# Patient Record
Sex: FEMALE | Race: WHITE
[De-identification: names, ages, dates, MRNs, and addresses within clinical notes are randomized per-mention and may not be internally consistent; named-entity substitution may affect disease eponyms.]

---

## 2019-01-28 ENCOUNTER — HOSPITAL ENCOUNTER (OUTPATIENT)
Dept: HOSPITAL 75 - RAD | Age: 39
End: 2019-01-28
Attending: NURSE PRACTITIONER
Payer: COMMERCIAL

## 2019-01-28 DIAGNOSIS — Z12.31: Primary | ICD-10-CM

## 2019-01-28 PROCEDURE — 77067 SCR MAMMO BI INCL CAD: CPT

## 2019-01-29 NOTE — DIAGNOSTIC IMAGING REPORT
EXAMINATION: Digital Mammogram bilateral screening with 3D

tomosynthesis and computer-aided detection (CAD) system.



INDICATION: Screening.



COMPARISON: There are no prior studies available for comparison.



At this time, there are no current complaints.



FINDINGS: The fibroglandular tissue in both breasts is

heterogeneously dense. This does limit the sensitivity of this

exam. There are a few benign-appearing calcifications in the

retroareolar region of the left breast. There is no primary or

secondary sign of malignancy noted.



IMPRESSION:

1. There is no evidence of malignancy.

2. The patient should have her annual bilateral screening

mammogram on schedule in January of 2019.



ACR BI-RADS Category 1: Negative.

Result letter will be mailed to the patient.

Note:  At least 10% of breast cancer is not imaged by

mammography.



Dictated by: 



  Dictated on workstation # [id number]

## 2020-08-15 ENCOUNTER — HOSPITAL ENCOUNTER (EMERGENCY)
Dept: HOSPITAL 75 - ER | Age: 40
Discharge: HOME | End: 2020-08-15
Payer: COMMERCIAL

## 2020-08-15 VITALS — DIASTOLIC BLOOD PRESSURE: 78 MMHG | SYSTOLIC BLOOD PRESSURE: 103 MMHG

## 2020-08-15 VITALS — HEIGHT: 65.91 IN | BODY MASS INDEX: 30.62 KG/M2 | WEIGHT: 188.27 LBS

## 2020-08-15 DIAGNOSIS — N20.1: Primary | ICD-10-CM

## 2020-08-15 LAB
ALBUMIN SERPL-MCNC: 4.5 GM/DL (ref 3.2–4.5)
ALP SERPL-CCNC: 75 U/L (ref 40–136)
ALT SERPL-CCNC: 15 U/L (ref 0–55)
APTT PPP: YELLOW S
BACTERIA #/AREA URNS HPF: (no result) /HPF
BASOPHILS # BLD AUTO: 0 10^3/UL (ref 0–0.1)
BASOPHILS NFR BLD AUTO: 1 % (ref 0–10)
BILIRUB SERPL-MCNC: 0.8 MG/DL (ref 0.1–1)
BILIRUB UR QL STRIP: NEGATIVE
BUN/CREAT SERPL: 17
CALCIUM SERPL-MCNC: 9.3 MG/DL (ref 8.5–10.1)
CHLORIDE SERPL-SCNC: 108 MMOL/L (ref 98–107)
CO2 SERPL-SCNC: 21 MMOL/L (ref 21–32)
CREAT SERPL-MCNC: 0.88 MG/DL (ref 0.6–1.3)
EOSINOPHIL # BLD AUTO: 0.1 10^3/UL (ref 0–0.3)
EOSINOPHIL NFR BLD AUTO: 1 % (ref 0–10)
ERYTHROCYTE [DISTWIDTH] IN BLOOD BY AUTOMATED COUNT: 12.2 % (ref 10–14.5)
FIBRINOGEN PPP-MCNC: CLEAR MG/DL
GFR SERPLBLD BASED ON 1.73 SQ M-ARVRAT: > 60 ML/MIN
GLUCOSE SERPL-MCNC: 99 MG/DL (ref 70–105)
GLUCOSE UR STRIP-MCNC: NEGATIVE MG/DL
HCT VFR BLD CALC: 42 % (ref 35–52)
HGB BLD-MCNC: 14 G/DL (ref 11.5–16)
KETONES UR QL STRIP: (no result)
LEUKOCYTE ESTERASE UR QL STRIP: NEGATIVE
LYMPHOCYTES # BLD AUTO: 1.8 X 10^3 (ref 1–4)
LYMPHOCYTES NFR BLD AUTO: 36 % (ref 12–44)
MANUAL DIFFERENTIAL PERFORMED BLD QL: NO
MCH RBC QN AUTO: 30 PG (ref 25–34)
MCHC RBC AUTO-ENTMCNC: 34 G/DL (ref 32–36)
MCV RBC AUTO: 90 FL (ref 80–99)
MONOCYTES # BLD AUTO: 0.3 X 10^3 (ref 0–1)
MONOCYTES NFR BLD AUTO: 7 % (ref 0–12)
NEUTROPHILS # BLD AUTO: 2.8 X 10^3 (ref 1.8–7.8)
NEUTROPHILS NFR BLD AUTO: 55 % (ref 42–75)
NITRITE UR QL STRIP: NEGATIVE
PH UR STRIP: 6 [PH] (ref 5–9)
PLATELET # BLD: 199 10^3/UL (ref 130–400)
PMV BLD AUTO: 9.8 FL (ref 7.4–10.4)
POTASSIUM SERPL-SCNC: 3.7 MMOL/L (ref 3.6–5)
PROT SERPL-MCNC: 7.7 GM/DL (ref 6.4–8.2)
PROT UR QL STRIP: (no result)
SODIUM SERPL-SCNC: 141 MMOL/L (ref 135–145)
SP GR UR STRIP: >=1.03 (ref 1.02–1.02)
SQUAMOUS #/AREA URNS HPF: (no result) /HPF
WBC # BLD AUTO: 5 10^3/UL (ref 4.3–11)
WBC #/AREA URNS HPF: (no result) /HPF

## 2020-08-15 PROCEDURE — 80053 COMPREHEN METABOLIC PANEL: CPT

## 2020-08-15 PROCEDURE — 84703 CHORIONIC GONADOTROPIN ASSAY: CPT

## 2020-08-15 PROCEDURE — 36415 COLL VENOUS BLD VENIPUNCTURE: CPT

## 2020-08-15 PROCEDURE — 81000 URINALYSIS NONAUTO W/SCOPE: CPT

## 2020-08-15 PROCEDURE — 85025 COMPLETE CBC W/AUTO DIFF WBC: CPT

## 2020-08-15 PROCEDURE — 74176 CT ABD & PELVIS W/O CONTRAST: CPT

## 2020-08-15 PROCEDURE — 86141 C-REACTIVE PROTEIN HS: CPT

## 2020-08-15 NOTE — DIAGNOSTIC IMAGING REPORT
PROCEDURE: CT urinary tract, rule out kidney stone.



TECHNIQUE: Multiple contiguous axial images were obtained through

the abdomen and pelvis without the use of intravenous contrast.

Auto Exposure Controls were utilized during the CT exam to meet

ALARA standards for radiation dose reduction. 



INDICATION:  Left flank pain.



COMPARISON: There are no prior studies available for comparison.



FINDINGS: The images through the low pelvis do show that there is

a 3.5 mm calculus within the bladder just to the left of midline.

The left ureter and left renal pelvis are dilated and I suspect

that this calculus was producing partial obstruction of the left

collecting system before it passed into the bladder. There is

also a 2 mm nonobstructive calculus within the left kidney. There

is no sign of nephrolithiasis or urolithiasis on the right.



There is no acute pelvic abnormality noted otherwise. The urinary

bladder itself is grossly unremarkable. The uterus does not

appear to be enlarged. There is no pelvic mass or free fluid

collection noted. The appendix is not well visualized, but there

are no indirect signs of acute appendicitis.



The liver, spleen, pancreas, gallbladder, adrenals, aorta and

inferior vena cava show no sign of an acute abnormality. There is

a small hiatal hernia. The lung bases are clear. The bone windows

show no evidence for a fracture or for a destructive lesion.



IMPRESSION:

1. There is a 3.5 mm calculus within the bladder just to the left

of midline. Most likely, this calculus was recently passed into

the bladder, and I suspect that the calculus was producing

partial obstruction of the left collecting system.

2. There is no acute abnormality of the abdomen or pelvis noted

otherwise.

3. The appendix is not well visualized.



Dictated by: 



  Dictated on workstation # [id number]

## 2020-08-15 NOTE — ED ABDOMINAL PAIN
General


Chief Complaint:  Abdominal/GI Problems


Stated Complaint:  ABD PAIN


Nursing Triage Note:  


AMB TO ED C/O L LOWER QUAD  PAIN ONSET 3 DAYS AGO.   DESCRIBES AS CRAMPING. 3 


DAYS AGO HAD  CONSTIPATION NOW IS INCREASED ABD CRAMPING WITH  DIARRHEA AND 


NAUSEA.


Sepsis Screen:  No Definite Risk


Source of Information:  Patient


Exam Limitations:  No Limitations





History of Present Illness


Date Seen by Provider:  Aug 15, 2020


Time Seen by Provider:  10:02


Initial Comments


Here with report of left lower quadrant abdominal pain that has been gone on for

3 days. It was better and worse. She thought she might be constipated but had a 

little diarrhea. The cramping is much worse today. She has not had anything for 

the pain. She is uncomfortable really in any position. Denies fever or chills 

although is a little bit clammy currently. Denies upper respiratory symptoms. 

Has had tubal ligation as well as 2 previous C-sections. Last menstrual period 

was a week ago when uncomplicated. She is sexually active but it's been several 

weeks. She is monogamous with single partner that is her . Denies blood 

in her urine or stool.


Timing/Duration:  3-4 Days


Severity/Quality:  Moderate, Aching, Cramping


Location:  [facility]


Radiation:  Back


Activities at Onset:  None


Modifying Factors:  Improves With Defecating


Associated Symptoms:  Back Pain; No Chest Pain, No Fever/Chills; 

Nausea/Vomiting; No Shortness of Air, No Swelling/Mass in Abdomen, No Weakness





Allergies and Home Medications


Allergies


Coded Allergies:  


     No Known Drug Allergies (Unverified , 8/15/20)





Patient Home Medication List


Home Medication List Reviewed:  Yes





Review of Systems


Review of Systems


Constitutional:  see HPI; No chills, No fever


EENTM:  No Symptoms Reported


Respiratory:  Denies Cough, Denies Shortness of Air


Cardiovascular:  Denies Chest Pain, Denies Edema


Gastrointestinal:  Abdominal Pain, Diarrhea, Vomiting


Genitourinary:  Denies Burning, Denies Drainage, Denies Frequency, Denies Flank 

Pain, Denies Hematuria


Musculoskeletal:  see HPI; No muscle pain





All Other Systems Reviewed


Negative Unless Noted:  Yes





Past Medical-Social-Family Hx


Past Med/Social Hx:  Reviewed Nursing Past Med/Soc Hx


Patient Social History


Alcohol Use:  Denies Use


Recreational Drug Use:  No


Smoking Status:  Never a Smoker


Recent Foreign Travel:  No


Contact w/Someone Who Travel:  No


Recent Infectious Disease Expo:  No





Past Medical History


Surgeries:  Yes


 Section


Cardiac:  No


Genitourinary:  No


Gastrointestinal:  Yes


Gastroesophageal Reflux


Musculoskeletal:  No


Endocrine:  No


HEENT:  No


Cancer:  No


Psychosocial:  No





Family Medical History


Reviewed Nursing Family Hx





Physical Exam


Vital Signs





Vital Signs - First Documented








 8/15/20





 09:04


 


Temp 36.6


 


Pulse 88


 


Resp 18


 


B/P (MAP) 129/100 (110)


 


Pulse Ox 100


 


O2 Delivery Room Air





Capillary Refill : Less Than 3 Seconds


Height/Weight/BMI


Height: '"


Weight: lbs. oz. kg; 30.00 BMI


Method:


General Appearance:  WD/WN, no apparent distress


HEENT:  PERRL/EOMI, pharynx normal


Neck:  full range of motion, supple


Respiratory:  lungs clear, normal breath sounds


Cardiovascular:  regular rate, rhythm, no murmur


Gastrointestinal:  soft; No guarding, No rebound; tenderness (left lower 

quadrant and suprapubic)


Extremities:  non-tender, normal inspection


Back:  normal inspection, no CVA tenderness, no vertebral tenderness


Neurologic/Psychiatric:  alert, oriented x 3


Skin:  normal color, warm/dry





Progress/Results/Core Measures


Results/Orders


Lab Results





Laboratory Tests








Test


 8/15/20


09:21 8/15/20


11:10 Range/Units


 


 


White Blood Count


 5.0 


 


 4.3-11.0


10^3/uL


 


Red Blood Count


 4.62 


 


 4.35-5.85


10^6/uL


 


Hemoglobin 14.0   11.5-16.0  G/DL


 


Hematocrit 42   35-52  %


 


Mean Corpuscular Volume 90   80-99  FL


 


Mean Corpuscular Hemoglobin 30   25-34  PG


 


Mean Corpuscular Hemoglobin


Concent 34 


 


 32-36  G/DL





 


Red Cell Distribution Width 12.2   10.0-14.5  %


 


Platelet Count


 199 


 


 130-400


10^3/uL


 


Mean Platelet Volume 9.8   7.4-10.4  FL


 


Neutrophils (%) (Auto) 55   42-75  %


 


Lymphocytes (%) (Auto) 36   12-44  %


 


Monocytes (%) (Auto) 7   0-12  %


 


Eosinophils (%) (Auto) 1   0-10  %


 


Basophils (%) (Auto) 1   0-10  %


 


Neutrophils # (Auto) 2.8   1.8-7.8  X 10^3


 


Lymphocytes # (Auto) 1.8   1.0-4.0  X 10^3


 


Monocytes # (Auto) 0.3   0.0-1.0  X 10^3


 


Eosinophils # (Auto)


 0.1 


 


 0.0-0.3


10^3/uL


 


Basophils # (Auto)


 0.0 


 


 0.0-0.1


10^3/uL


 


Sodium Level 141   135-145  MMOL/L


 


Potassium Level 3.7   3.6-5.0  MMOL/L


 


Chloride Level 108 H    MMOL/L


 


Carbon Dioxide Level 21   21-32  MMOL/L


 


Anion Gap 12   5-14  MMOL/L


 


Blood Urea Nitrogen 15   7-18  MG/DL


 


Creatinine


 0.88 


 


 0.60-1.30


MG/DL


 


Estimat Glomerular Filtration


Rate > 60 


 


  





 


BUN/Creatinine Ratio 17    


 


Glucose Level 99     MG/DL


 


Calcium Level 9.3   8.5-10.1  MG/DL


 


Corrected Calcium 8.9   8.5-10.1  MG/DL


 


Total Bilirubin 0.8   0.1-1.0  MG/DL


 


Aspartate Amino Transf


(AST/SGOT) 17 


 


 5-34  U/L





 


Alanine Aminotransferase


(ALT/SGPT) 15 


 


 0-55  U/L





 


Alkaline Phosphatase 75     U/L


 


C-Reactive Protein High


Sensitivity 0.18 


 


 0.00-0.50


MG/DL


 


Total Protein 7.7   6.4-8.2  GM/DL


 


Albumin 4.5   3.2-4.5  GM/DL


 


Serum Pregnancy Test,


Qualitative NEGATIVE 


 


 NEGATIVE  





 


Urine Color  YELLOW   


 


Urine Clarity  CLEAR   


 


Urine pH  6.0  5-9  


 


Urine Specific Gravity  >=1.030  1.016-1.022  


 


Urine Protein  3+ H NEGATIVE  


 


Urine Glucose (UA)  NEGATIVE  NEGATIVE  


 


Urine Ketones  TRACE H NEGATIVE  


 


Urine Nitrite  NEGATIVE  NEGATIVE  


 


Urine Bilirubin  NEGATIVE  NEGATIVE  


 


Urine Urobilinogen  0.2  < = 1.0  MG/DL


 


Urine Leukocyte Esterase  NEGATIVE  NEGATIVE  


 


Urine RBC (Auto)  3+ H NEGATIVE  


 


Urine RBC  10-25 H  /HPF


 


Urine WBC  NONE   /HPF


 


Urine Squamous Epithelial


Cells 


 5-10 


  /HPF





 


Urine Crystals  NONE   /LPF


 


Urine Bacteria  FEW H  /HPF


 


Urine Casts  NONE   /LPF


 


Urine Mucus  SMALL H  /LPF


 


Urine Culture Indicated  NO   








My Orders





Orders - ELIGIO BETHEA MD


Cbc With Automated Diff (8/15/20 10:07)


Comprehensive Metabolic Panel (8/15/20 10:07)


Hs C Reactive Protein (8/15/20 10:07)


Hcg,Qualitative Serum (8/15/20 10:07)


Ua Culture If Indicated (8/15/20 10:07)


Ondansetron Injection (Zofran Injectio (8/15/20 10:15)


Lactated Ringers (Lr 1000 Ml Iv Solution (8/15/20 10:07)


Ed Iv/Invasive Line Start (8/15/20 10:07)


Fentanyl  Injection (Sublimaze Injection (8/15/20 10:07)


Ketorolac Injection (Toradol Injection) (8/15/20 10:07)


Ct Abd/Pelvis Wo(Kidney Stone) (8/15/20 11:58)


Fentanyl  Injection (Sublimaze Injection (8/15/20 12:00)


Phenazopyridine Tablet (Pyridium Tablet) (8/15/20 12:58)


Ceftriaxone  For Iv Use (Rocephin  For I (8/15/20 12:58)





Medications Given in ED





Current Medications








 Medications  Dose


 Ordered  Sig/Stuart


 Route  Start Time


 Stop Time Status Last Admin


Dose Admin


 


 Ondansetron HCl  4 mg  ONCE  ONCE


 IVP  8/15/20 10:15


 8/15/20 10:16 DC 8/15/20 10:16


4 MG








Vital Signs/I&O











 8/15/20





 09:04


 


Temp 36.6


 


Pulse 88


 


Resp 18


 


B/P (MAP) 129/100 (110)


 


Pulse Ox 100


 


O2 Delivery Room Air














Blood Pressure Mean:                    110











Progress


Progress Note :  


Progress Note


Seen and evaluated. IV, labs, UA, LR 1 L bolus, Zofran 4 mg IV, Toradol 30 mg IV

and fentanyl 50 g IV ordered. Monitor patient. 1159: CT abdomen and pelvis 

without contrast, kidney stone protocol, ordered. Patient noted to have blood in

her urine. Pain was better after meds earlier but has slowly returned some. We 

will give fentanyl 50 g IV if pain worsens. Monitor patient. 1305: Peridium 100

mg by mouth. Rocephin 1 g IV. CT does show stone in the bladder. We will 

continue outpatient pain meds when necessary as well as antibiotics. This was 

discussed with the patient who agrees. Discharged home with return precautions. 

Patient verbalize understanding instructions and agreement with plan.





Diagnostic Imaging





   Diagonstic Imaging:  CT


   Plain Films/CT/US/NM/MRI:  abdomen, pelvis


Comments


                 [facility].


                                [place], [place]





NAME:   GARY BURGOS


MED REC#:   [id number]


ACCOUNT#:   [id number]


PT STATUS:   REG ER


:   1980


PHYSICIAN:   ELIGIO BETHEA MD


ADMIT DATE:   08/15/20/ER


                                   ***Draft***


Date of Exam:08/15/20





CT ABD/PELVIS WO(KIDNEY STONE)








PROCEDURE: CT urinary tract, rule out kidney stone.





TECHNIQUE: Multiple contiguous axial images were obtained through


the abdomen and pelvis without the use of intravenous contrast.


Auto Exposure Controls were utilized during the CT exam to meet


ALARA standards for radiation dose reduction. 





INDICATION:  Left flank pain.





COMPARISON: There are no prior studies available for comparison.





FINDINGS: The images through the low pelvis do show that there is


a 3.5 mm calculus within the bladder just to the left of midline.


The left ureter and left renal pelvis are dilated and I suspect


that this calculus was producing partial obstruction of the left


collecting system before it passed into the bladder. There is


also a 2 mm nonobstructive calculus within the left kidney. There


is no sign of nephrolithiasis or urolithiasis on the right.





There is no acute pelvic abnormality noted otherwise. The urinary


bladder itself is grossly unremarkable. The uterus does not


appear to be enlarged. There is no pelvic mass or free fluid


collection noted. The appendix is not well visualized, but there


are no indirect signs of acute appendicitis.





The liver, spleen, pancreas, gallbladder, adrenals, aorta and


inferior vena cava show no sign of an acute abnormality. There is


a small hiatal hernia. The lung bases are clear. The bone windows


show no evidence for a fracture or for a destructive lesion.





IMPRESSION:


1. There is a 3.5 mm calculus within the bladder just to the left


of midline. Most likely, this was producing partial obstruction


of the left collecting system.


2. There is no acute abnormality of the abdomen or pelvis noted


otherwise.


3. The appendix is not well visualized.





  Dictated on workstation # [id number]








Dict:   08/15/20 [id number]


Trans:   08/15/20 1249


 [id number]





Interpreted by:     SUSU KIDD MD


Electronically signed by:





Departure


Impression





   Primary Impression:  


   Left ureteral stone


Disposition:  01 HOME, SELF-CARE


Condition:  Improved





Departure-Patient Inst.


Decision time for Depature:  13:06


Referrals:  


[facility] (PCP/Family)


Primary Care Physician


Patient Instructions:  Kidney Stones (DC)





Add. Discharge Instructions:  








All discharge instructions reviewed with patient and/or family. Voiced 

understanding.





Drink plenty of fluids. You may take ibuprofen 600 mg every 8 hours as needed 

for pain. Take prescribed pain medicine as directed. If you are not taking the 

prescribed hydrocodone/acetaminophen you may take Tylenol/acetaminophen 1000 mg 

every 8 hours as needed for pain. Do not take both at the same time as they both

have acetaminophen in them. Strain urine to verify stone has passed. Return for 

worse pain, fever, vomiting, weakness, breathing problems or other concerns as 

needed. Follow-up with your Dr. in a few days for recheck as needed.


Scripts


Phenazopyridine HCl (Pyridium) 100 Mg Tablet


100 MG PO Q8H PRN for PAIN-MODERATE (5-7), #6 TAB 0 Refills


   Prov: ELIGIO BETHEA MD         8/15/20 


Hydrocodone/Acetaminophen (Hydrocodone/Acetaminophen 5 MG/325 MG TAB) 1 Each 

Tablet


1 TAB PO Q6H for Pain MDD 10 TABS for 7 Days, #8 TAB 0 Refills


   Prov: ELIGIO BETHEA MD         8/15/20 


Cephalexin (Cephalexin) 500 Mg Tablet


500 MG PO BID, #10 TAB 0 Refills


   Prov: ELIGIO BETHEA MD         8/15/20











ELIGIO BETHEA MD          Aug 15, 2020 10:39

## 2021-05-11 ENCOUNTER — HOSPITAL ENCOUNTER (OUTPATIENT)
Dept: HOSPITAL 75 - RAD | Age: 41
End: 2021-05-11
Attending: NURSE PRACTITIONER
Payer: COMMERCIAL

## 2021-05-11 DIAGNOSIS — Z12.31: Primary | ICD-10-CM

## 2021-05-11 PROCEDURE — 77067 SCR MAMMO BI INCL CAD: CPT

## 2021-05-11 PROCEDURE — 77063 BREAST TOMOSYNTHESIS BI: CPT

## 2021-05-11 NOTE — DIAGNOSTIC IMAGING REPORT
INDICATION: 

Routine screening.



COMPARISON:  

01/28/2019.



TECHNIQUE: 

2D and 3D bilateral screening mammography was performed with CAD.



FINDINGS:

Both breasts are heterogeneously dense, limiting the sensitivity

of mammography. The parenchymal pattern is stable. No mass or

malignant appearing microcalcifications are seen. The axillae are

unremarkable.



IMPRESSION: 

No mammographic features suspicious for malignancy are

identified.



ACR BI-RADS Category 1: Negative.

Result letter will be mailed to the patient.

Note:  At least 10% of breast cancer is not imaged by

mammography.



Dictated by: 



  Dictated on workstation # [id number]

## 2022-06-27 ENCOUNTER — HOSPITAL ENCOUNTER (OUTPATIENT)
Dept: HOSPITAL 75 - CARD | Age: 42
End: 2022-06-27
Attending: PHYSICIAN ASSISTANT
Payer: COMMERCIAL

## 2022-06-27 DIAGNOSIS — K21.9: Primary | ICD-10-CM

## 2022-06-27 PROCEDURE — 78227 HEPATOBIL SYST IMAGE W/DRUG: CPT

## 2022-06-27 NOTE — DIAGNOSTIC IMAGING REPORT
INDICATION:  Gastroesophageal reflux disease.



Patient was administered 5.5 mCi technetium 99M Choletec and

imaging over the abdomen was performed. At 45 minutes patient

ingested Ensure and gallbladder ejection fraction was calculated.



There is homogeneous uptake of activity by the liver with prompt

excretion of activity into the common duct and gallbladder. There

is normal passage of activity into the small bowel. Gallbladder

ejection fraction is normal at 67%.



IMPRESSION: Normal HIDA scan and gallbladder ejection fraction.



Dictated by: 



  Dictated on workstation # [id number]